# Patient Record
Sex: FEMALE | Race: WHITE
[De-identification: names, ages, dates, MRNs, and addresses within clinical notes are randomized per-mention and may not be internally consistent; named-entity substitution may affect disease eponyms.]

---

## 2017-05-10 ENCOUNTER — HOSPITAL ENCOUNTER (OUTPATIENT)
Dept: HOSPITAL 62 - RAD | Age: 22
End: 2017-05-10
Attending: NURSE PRACTITIONER
Payer: MEDICAID

## 2017-05-10 DIAGNOSIS — Z34.02: Primary | ICD-10-CM

## 2017-05-10 PROCEDURE — 76805 OB US >/= 14 WKS SNGL FETUS: CPT

## 2017-07-27 ENCOUNTER — HOSPITAL ENCOUNTER (OUTPATIENT)
Dept: HOSPITAL 62 - LC | Age: 22
Discharge: HOME | End: 2017-07-27
Attending: OBSTETRICS & GYNECOLOGY
Payer: MEDICAID

## 2017-07-27 DIAGNOSIS — O14.93: Primary | ICD-10-CM

## 2017-07-27 DIAGNOSIS — Z3A.37: ICD-10-CM

## 2017-07-27 LAB
ALBUMIN SERPL-MCNC: 2.9 G/DL (ref 3.5–5)
ALP SERPL-CCNC: 155 U/L (ref 38–126)
ALT SERPL-CCNC: 24 U/L (ref 9–52)
ANION GAP SERPL CALC-SCNC: 8 MMOL/L (ref 5–19)
APPEARANCE UR: (no result)
APPEARANCE UR: CLEAR
AST SERPL-CCNC: 17 U/L (ref 14–36)
BARBITURATES UR QL SCN: NEGATIVE
BASOPHILS # BLD AUTO: 0 10^3/UL (ref 0–0.2)
BASOPHILS NFR BLD AUTO: 0.3 % (ref 0–2)
BILIRUB DIRECT SERPL-MCNC: 0.2 MG/DL (ref 0–0.4)
BILIRUB SERPL-MCNC: 0.3 MG/DL (ref 0.2–1.3)
BILIRUB UR QL STRIP: NEGATIVE
BILIRUB UR QL STRIP: NEGATIVE
BUN SERPL-MCNC: 10 MG/DL (ref 7–20)
CALCIUM: 8.8 MG/DL (ref 8.4–10.2)
CHLORIDE SERPL-SCNC: 108 MMOL/L (ref 98–107)
CO2 SERPL-SCNC: 19 MMOL/L (ref 22–30)
CREAT SERPL-MCNC: 0.75 MG/DL (ref 0.52–1.25)
CREAT UR-MCNC: 27.7 MG/DL (ref 16–327)
CREAT UR-MCNC: 309.4 MG/DL (ref 16–327)
EOSINOPHIL # BLD AUTO: 0 10^3/UL (ref 0–0.6)
EOSINOPHIL NFR BLD AUTO: 0.2 % (ref 0–6)
ERYTHROCYTE [DISTWIDTH] IN BLOOD BY AUTOMATED COUNT: 15.2 % (ref 11.5–14)
GLUCOSE SERPL-MCNC: 96 MG/DL (ref 75–110)
GLUCOSE UR STRIP-MCNC: NEGATIVE MG/DL
GLUCOSE UR STRIP-MCNC: NEGATIVE MG/DL
HCT VFR BLD CALC: 35.7 % (ref 36–47)
HGB BLD-MCNC: 12 G/DL (ref 12–15.5)
HGB HCT DIFFERENCE: 0.3
KETONES UR STRIP-MCNC: NEGATIVE MG/DL
KETONES UR STRIP-MCNC: NEGATIVE MG/DL
LDH1 SERPL-CCNC: 398 U/L (ref 313–618)
LYMPHOCYTES # BLD AUTO: 1.8 10^3/UL (ref 0.5–4.7)
LYMPHOCYTES NFR BLD AUTO: 16.5 % (ref 13–45)
MCH RBC QN AUTO: 27.7 PG (ref 27–33.4)
MCHC RBC AUTO-ENTMCNC: 33.5 G/DL (ref 32–36)
MCV RBC AUTO: 83 FL (ref 80–97)
METHADONE UR QL SCN: NEGATIVE
MONOCYTES # BLD AUTO: 0.7 10^3/UL (ref 0.1–1.4)
MONOCYTES NFR BLD AUTO: 6.6 % (ref 3–13)
NEUTROPHILS # BLD AUTO: 8.1 10^3/UL (ref 1.7–8.2)
NEUTS SEG NFR BLD AUTO: 76.4 % (ref 42–78)
NITRITE UR QL STRIP: NEGATIVE
NITRITE UR QL STRIP: NEGATIVE
PCP UR QL SCN: NEGATIVE
PH UR STRIP: 5 [PH] (ref 5–9)
PH UR STRIP: 6 [PH] (ref 5–9)
POTASSIUM SERPL-SCNC: 4.4 MMOL/L (ref 3.6–5)
PROT SERPL-MCNC: 6 G/DL (ref 6.3–8.2)
PROT UR STRIP-MCNC: 100 MG/DL
PROT UR STRIP-MCNC: 1031.9 MG/DL (ref ?–12)
PROT UR STRIP-MCNC: 93.2 MG/DL (ref ?–12)
PROT UR STRIP-MCNC: >=500 MG/DL
RBC # BLD AUTO: 4.32 10^6/UL (ref 3.72–5.28)
SODIUM SERPL-SCNC: 134.8 MMOL/L (ref 137–145)
SP GR UR STRIP: 1
SP GR UR STRIP: 1.03
URATE SERPL-MCNC: 6.2 MG/DL (ref 2.5–6.2)
URINE OPIATES LOW: NEGATIVE
UROBILINOGEN UR-MCNC: NEGATIVE MG/DL (ref ?–2)
UROBILINOGEN UR-MCNC: NEGATIVE MG/DL (ref ?–2)
WBC # BLD AUTO: 10.6 10^3/UL (ref 4–10.5)

## 2017-07-27 PROCEDURE — 36415 COLL VENOUS BLD VENIPUNCTURE: CPT

## 2017-07-27 PROCEDURE — 84156 ASSAY OF PROTEIN URINE: CPT

## 2017-07-27 PROCEDURE — 82570 ASSAY OF URINE CREATININE: CPT

## 2017-07-27 PROCEDURE — 85025 COMPLETE CBC W/AUTO DIFF WBC: CPT

## 2017-07-27 PROCEDURE — 80053 COMPREHEN METABOLIC PANEL: CPT

## 2017-07-27 PROCEDURE — 83615 LACTATE (LD) (LDH) ENZYME: CPT

## 2017-07-27 PROCEDURE — 84550 ASSAY OF BLOOD/URIC ACID: CPT

## 2017-07-27 PROCEDURE — 81001 URINALYSIS AUTO W/SCOPE: CPT

## 2017-07-27 PROCEDURE — 59025 FETAL NON-STRESS TEST: CPT

## 2017-07-27 PROCEDURE — 80307 DRUG TEST PRSMV CHEM ANLYZR: CPT

## 2017-07-27 PROCEDURE — 4A1HXCZ MONITORING OF PRODUCTS OF CONCEPTION, CARDIAC RATE, EXTERNAL APPROACH: ICD-10-PCS | Performed by: OBSTETRICS & GYNECOLOGY

## 2017-07-27 NOTE — NON STRESS TEST REPORT
=================================================================

Non Stress Test

=================================================================

Datetime Report Generated by CPN: 07/27/2017 21:01

   

   

=================================================================

INDICATION

=================================================================

   

Indication for Study:  Ordered by Provider

Indication for Study (NST) Other:  Pre-E W/U

   

=================================================================

MONITORING

=================================================================

   

Monitor Explained:  Monitor Explained; Test Explained; Patient

   Verbalized Understanding

Time on Monitor:  07/27/2017 15:32

Time off Monitor:  07/27/2017 16:13

NST Duration:  41

   

=================================================================

NST INTERVENTIONS

=================================================================

   

NST Interventions:  PO Hydration; IV Fluids; Reposition Patient

Physician Notified NST:  Dr. Will

BABY A:  [id number]

   

=================================================================

BABY A

=================================================================

   

Fetal Movement :  Present

Contraction Frequency :  Irritability

FHR Baseline :  135

Accelerations :  15X15

Decelerations :  None

Variability :  Moderate 6-25bpm

NST Review:  Meets Criteria for Reactive NST

NST Review and Verified By :  best abel RN

NST Results:  Reactive

   

=================================================================

NST REPORT

=================================================================

   

Report Trigger:  Send Report

## 2017-07-31 ENCOUNTER — HOSPITAL ENCOUNTER (INPATIENT)
Dept: HOSPITAL 62 - LR | Age: 22
LOS: 3 days | Discharge: HOME | End: 2017-08-03
Attending: OBSTETRICS & GYNECOLOGY | Admitting: OBSTETRICS & GYNECOLOGY
Payer: MEDICAID

## 2017-07-31 DIAGNOSIS — E66.9: ICD-10-CM

## 2017-07-31 DIAGNOSIS — Z3A.37: ICD-10-CM

## 2017-07-31 LAB
ALBUMIN SERPL-MCNC: 3 G/DL (ref 3.5–5)
ALP SERPL-CCNC: 169 U/L (ref 38–126)
ALT SERPL-CCNC: 27 U/L (ref 9–52)
ANION GAP SERPL CALC-SCNC: 9 MMOL/L (ref 5–19)
APPEARANCE UR: (no result)
APTT BLD: 28.1 SEC (ref 23.5–35.8)
AST SERPL-CCNC: 21 U/L (ref 14–36)
BARBITURATES UR QL SCN: NEGATIVE
BASOPHILS # BLD AUTO: 0 10^3/UL (ref 0–0.2)
BASOPHILS NFR BLD AUTO: 0.3 % (ref 0–2)
BILIRUB DIRECT SERPL-MCNC: 0.2 MG/DL (ref 0–0.4)
BILIRUB SERPL-MCNC: 0.3 MG/DL (ref 0.2–1.3)
BILIRUB UR QL STRIP: NEGATIVE
BUN SERPL-MCNC: 10 MG/DL (ref 7–20)
CALCIUM: 9.2 MG/DL (ref 8.4–10.2)
CHLORIDE SERPL-SCNC: 107 MMOL/L (ref 98–107)
CO2 SERPL-SCNC: 19 MMOL/L (ref 22–30)
CREAT SERPL-MCNC: 0.78 MG/DL (ref 0.52–1.25)
CREAT UR-MCNC: 211.5 MG/DL (ref 16–327)
EOSINOPHIL # BLD AUTO: 0 10^3/UL (ref 0–0.6)
EOSINOPHIL NFR BLD AUTO: 0.1 % (ref 0–6)
ERYTHROCYTE [DISTWIDTH] IN BLOOD BY AUTOMATED COUNT: 15.5 % (ref 11.5–14)
GLUCOSE SERPL-MCNC: 62 MG/DL (ref 75–110)
GLUCOSE UR STRIP-MCNC: NEGATIVE MG/DL
HCT VFR BLD CALC: 34.9 % (ref 36–47)
HGB BLD-MCNC: 11.9 G/DL (ref 12–15.5)
HGB HCT DIFFERENCE: 0.8
KETONES UR STRIP-MCNC: NEGATIVE MG/DL
LDH1 SERPL-CCNC: 468 U/L (ref 313–618)
LYMPHOCYTES # BLD AUTO: 3 10^3/UL (ref 0.5–4.7)
LYMPHOCYTES NFR BLD AUTO: 26.2 % (ref 13–45)
MCH RBC QN AUTO: 28.4 PG (ref 27–33.4)
MCHC RBC AUTO-ENTMCNC: 34.2 G/DL (ref 32–36)
MCV RBC AUTO: 83 FL (ref 80–97)
METHADONE UR QL SCN: NEGATIVE
MONOCYTES # BLD AUTO: 0.8 10^3/UL (ref 0.1–1.4)
MONOCYTES NFR BLD AUTO: 7 % (ref 3–13)
NEUTROPHILS # BLD AUTO: 7.5 10^3/UL (ref 1.7–8.2)
NEUTS SEG NFR BLD AUTO: 66.4 % (ref 42–78)
NITRITE UR QL STRIP: NEGATIVE
PCP UR QL SCN: NEGATIVE
PH UR STRIP: 5 [PH] (ref 5–9)
POTASSIUM SERPL-SCNC: 4.4 MMOL/L (ref 3.6–5)
PROT SERPL-MCNC: 6.3 G/DL (ref 6.3–8.2)
PROT UR STRIP-MCNC: 871.8 MG/DL (ref ?–12)
PROT UR STRIP-MCNC: >=500 MG/DL
PROTHROMBIN TIME: 11.3 SEC (ref 11.4–15.4)
RBC # BLD AUTO: 4.2 10^6/UL (ref 3.72–5.28)
SODIUM SERPL-SCNC: 135.3 MMOL/L (ref 137–145)
SP GR UR STRIP: 1.02
URATE SERPL-MCNC: 6.6 MG/DL (ref 2.5–6.2)
URINE OPIATES LOW: NEGATIVE
UROBILINOGEN UR-MCNC: NEGATIVE MG/DL (ref ?–2)
WBC # BLD AUTO: 11.3 10^3/UL (ref 4–10.5)

## 2017-07-31 PROCEDURE — 36415 COLL VENOUS BLD VENIPUNCTURE: CPT

## 2017-07-31 PROCEDURE — 86900 BLOOD TYPING SEROLOGIC ABO: CPT

## 2017-07-31 PROCEDURE — 86901 BLOOD TYPING SEROLOGIC RH(D): CPT

## 2017-07-31 PROCEDURE — 3E0P7GC INTRODUCTION OF OTHER THERAPEUTIC SUBSTANCE INTO FEMALE REPRODUCTIVE, VIA NATURAL OR ARTIFICIAL OPENING: ICD-10-PCS | Performed by: OBSTETRICS & GYNECOLOGY

## 2017-07-31 PROCEDURE — 85610 PROTHROMBIN TIME: CPT

## 2017-07-31 PROCEDURE — 80053 COMPREHEN METABOLIC PANEL: CPT

## 2017-07-31 PROCEDURE — 85025 COMPLETE CBC W/AUTO DIFF WBC: CPT

## 2017-07-31 PROCEDURE — 4A1HXCZ MONITORING OF PRODUCTS OF CONCEPTION, CARDIAC RATE, EXTERNAL APPROACH: ICD-10-PCS | Performed by: OBSTETRICS & GYNECOLOGY

## 2017-07-31 PROCEDURE — 85027 COMPLETE CBC AUTOMATED: CPT

## 2017-07-31 PROCEDURE — 84112 EVAL AMNIOTIC FLUID PROTEIN: CPT

## 2017-07-31 PROCEDURE — 84550 ASSAY OF BLOOD/URIC ACID: CPT

## 2017-07-31 PROCEDURE — 81005 URINALYSIS: CPT

## 2017-07-31 PROCEDURE — 86850 RBC ANTIBODY SCREEN: CPT

## 2017-07-31 PROCEDURE — 85730 THROMBOPLASTIN TIME PARTIAL: CPT

## 2017-07-31 PROCEDURE — 82962 GLUCOSE BLOOD TEST: CPT

## 2017-07-31 PROCEDURE — 82570 ASSAY OF URINE CREATININE: CPT

## 2017-07-31 PROCEDURE — 84156 ASSAY OF PROTEIN URINE: CPT

## 2017-07-31 PROCEDURE — 80307 DRUG TEST PRSMV CHEM ANLYZR: CPT

## 2017-07-31 PROCEDURE — 76815 OB US LIMITED FETUS(S): CPT

## 2017-07-31 PROCEDURE — 86592 SYPHILIS TEST NON-TREP QUAL: CPT

## 2017-07-31 PROCEDURE — 83615 LACTATE (LD) (LDH) ENZYME: CPT

## 2017-07-31 NOTE — RADIOLOGY REPORT (SQ)
EXAM DESCRIPTION:  U/S OB LIMITED



COMPLETED DATE/TIME:  7/31/2017 10:04 pm



REASON FOR STUDY:  EFW, presentation, STEFANIE



COMPARISON:  None.



TECHNIQUE:  Limited transabdominal grayscale ultrasound for evaluation of specific requested obstetri
dalila parameters.



LIMITATIONS:  None.



FINDINGS:  CERVICAL LENGTH: Not visualized

STEFANIE: 10.8 cm.

FHR: 144 beats per minute.

PRESENTATION: Cephalic.

OTHER: Estimated fetal weight is 2666 +/- 395 g. estimated gestational age is 35 weeks 1 day with an 
estimated due date of 9/3/2017



IMPRESSION:  LIMITED OBSTETRICAL ULTRASOUND WITH MEASURED PARAMETERS DELINEATED ABOVE.

Trimester of pregnancy: Third trimester - 28 weeks to delivery.



TECHNICAL DOCUMENTATION:  JOB ID:  [id number]

 2011 [employer]- All Rights Reserved

## 2017-08-01 LAB
A1 MICROGLOB PLACENTAL VAG QL: POSITIVE
ALBUMIN SERPL-MCNC: 2.6 G/DL (ref 3.5–5)
ALBUMIN SERPL-MCNC: 2.7 G/DL (ref 3.5–5)
ALBUMIN SERPL-MCNC: 2.7 G/DL (ref 3.5–5)
ALP SERPL-CCNC: 148 U/L (ref 38–126)
ALP SERPL-CCNC: 158 U/L (ref 38–126)
ALP SERPL-CCNC: 172 U/L (ref 38–126)
ALT SERPL-CCNC: 20 U/L (ref 9–52)
ALT SERPL-CCNC: 25 U/L (ref 9–52)
ALT SERPL-CCNC: 26 U/L (ref 9–52)
ANION GAP SERPL CALC-SCNC: 9 MMOL/L (ref 5–19)
AST SERPL-CCNC: 19 U/L (ref 14–36)
AST SERPL-CCNC: 19 U/L (ref 14–36)
AST SERPL-CCNC: 20 U/L (ref 14–36)
BASOPHILS # BLD AUTO: 0 10^3/UL (ref 0–0.2)
BASOPHILS NFR BLD AUTO: 0.2 % (ref 0–2)
BASOPHILS NFR BLD AUTO: 0.3 % (ref 0–2)
BASOPHILS NFR BLD AUTO: 0.3 % (ref 0–2)
BILIRUB DIRECT SERPL-MCNC: 0.2 MG/DL (ref 0–0.4)
BILIRUB DIRECT SERPL-MCNC: 0.2 MG/DL (ref 0–0.4)
BILIRUB DIRECT SERPL-MCNC: 0.3 MG/DL (ref 0–0.4)
BILIRUB SERPL-MCNC: 0.3 MG/DL (ref 0.2–1.3)
BILIRUB SERPL-MCNC: 0.4 MG/DL (ref 0.2–1.3)
BILIRUB SERPL-MCNC: 0.5 MG/DL (ref 0.2–1.3)
BUN SERPL-MCNC: 10 MG/DL (ref 7–20)
BUN SERPL-MCNC: 9 MG/DL (ref 7–20)
BUN SERPL-MCNC: 9 MG/DL (ref 7–20)
CALCIUM: 8.8 MG/DL (ref 8.4–10.2)
CALCIUM: 8.8 MG/DL (ref 8.4–10.2)
CALCIUM: 8.9 MG/DL (ref 8.4–10.2)
CHLORIDE SERPL-SCNC: 109 MMOL/L (ref 98–107)
CHLORIDE SERPL-SCNC: 109 MMOL/L (ref 98–107)
CHLORIDE SERPL-SCNC: 110 MMOL/L (ref 98–107)
CO2 SERPL-SCNC: 16 MMOL/L (ref 22–30)
CO2 SERPL-SCNC: 18 MMOL/L (ref 22–30)
CO2 SERPL-SCNC: 19 MMOL/L (ref 22–30)
CREAT SERPL-MCNC: 0.73 MG/DL (ref 0.52–1.25)
CREAT SERPL-MCNC: 0.73 MG/DL (ref 0.52–1.25)
CREAT SERPL-MCNC: 0.76 MG/DL (ref 0.52–1.25)
EOSINOPHIL # BLD AUTO: 0 10^3/UL (ref 0–0.6)
EOSINOPHIL NFR BLD AUTO: 0 % (ref 0–6)
EOSINOPHIL NFR BLD AUTO: 0 % (ref 0–6)
EOSINOPHIL NFR BLD AUTO: 0.1 % (ref 0–6)
ERYTHROCYTE [DISTWIDTH] IN BLOOD BY AUTOMATED COUNT: 15.3 % (ref 11.5–14)
ERYTHROCYTE [DISTWIDTH] IN BLOOD BY AUTOMATED COUNT: 15.7 % (ref 11.5–14)
ERYTHROCYTE [DISTWIDTH] IN BLOOD BY AUTOMATED COUNT: 15.7 % (ref 11.5–14)
GLUCOSE SERPL-MCNC: 66 MG/DL (ref 75–110)
GLUCOSE SERPL-MCNC: 79 MG/DL (ref 75–110)
GLUCOSE SERPL-MCNC: 85 MG/DL (ref 75–110)
HCT VFR BLD CALC: 34.3 % (ref 36–47)
HCT VFR BLD CALC: 34.7 % (ref 36–47)
HCT VFR BLD CALC: 37.7 % (ref 36–47)
HGB BLD-MCNC: 11.5 G/DL (ref 12–15.5)
HGB BLD-MCNC: 11.6 G/DL (ref 12–15.5)
HGB BLD-MCNC: 12.5 G/DL (ref 12–15.5)
HGB HCT DIFFERENCE: -0.2
HGB HCT DIFFERENCE: 0.1
HGB HCT DIFFERENCE: 0.2
LDH1 SERPL-CCNC: 383 U/L (ref 313–618)
LDH1 SERPL-CCNC: 390 U/L (ref 313–618)
LDH1 SERPL-CCNC: 446 U/L (ref 313–618)
LYMPHOCYTES # BLD AUTO: 1.9 10^3/UL (ref 0.5–4.7)
LYMPHOCYTES # BLD AUTO: 2.5 10^3/UL (ref 0.5–4.7)
LYMPHOCYTES # BLD AUTO: 2.7 10^3/UL (ref 0.5–4.7)
LYMPHOCYTES NFR BLD AUTO: 14 % (ref 13–45)
LYMPHOCYTES NFR BLD AUTO: 15.5 % (ref 13–45)
LYMPHOCYTES NFR BLD AUTO: 25.3 % (ref 13–45)
MCH RBC QN AUTO: 27.6 PG (ref 27–33.4)
MCH RBC QN AUTO: 27.7 PG (ref 27–33.4)
MCH RBC QN AUTO: 27.7 PG (ref 27–33.4)
MCHC RBC AUTO-ENTMCNC: 33.2 G/DL (ref 32–36)
MCHC RBC AUTO-ENTMCNC: 33.5 G/DL (ref 32–36)
MCHC RBC AUTO-ENTMCNC: 33.7 G/DL (ref 32–36)
MCV RBC AUTO: 82 FL (ref 80–97)
MCV RBC AUTO: 83 FL (ref 80–97)
MCV RBC AUTO: 83 FL (ref 80–97)
MONOCYTES # BLD AUTO: 0.6 10^3/UL (ref 0.1–1.4)
MONOCYTES # BLD AUTO: 0.7 10^3/UL (ref 0.1–1.4)
MONOCYTES # BLD AUTO: 1.2 10^3/UL (ref 0.1–1.4)
MONOCYTES NFR BLD AUTO: 4.7 % (ref 3–13)
MONOCYTES NFR BLD AUTO: 6.5 % (ref 3–13)
MONOCYTES NFR BLD AUTO: 6.9 % (ref 3–13)
NEUTROPHILS # BLD AUTO: 14.1 10^3/UL (ref 1.7–8.2)
NEUTROPHILS # BLD AUTO: 7.1 10^3/UL (ref 1.7–8.2)
NEUTROPHILS # BLD AUTO: 9.8 10^3/UL (ref 1.7–8.2)
NEUTS SEG NFR BLD AUTO: 67.4 % (ref 42–78)
NEUTS SEG NFR BLD AUTO: 79.2 % (ref 42–78)
NEUTS SEG NFR BLD AUTO: 79.6 % (ref 42–78)
POTASSIUM SERPL-SCNC: 3.8 MMOL/L (ref 3.6–5)
POTASSIUM SERPL-SCNC: 4.4 MMOL/L (ref 3.6–5)
POTASSIUM SERPL-SCNC: 4.4 MMOL/L (ref 3.6–5)
PROT SERPL-MCNC: 5.6 G/DL (ref 6.3–8.2)
PROT SERPL-MCNC: 5.8 G/DL (ref 6.3–8.2)
PROT SERPL-MCNC: 5.8 G/DL (ref 6.3–8.2)
RBC # BLD AUTO: 4.17 10^6/UL (ref 3.72–5.28)
RBC # BLD AUTO: 4.19 10^6/UL (ref 3.72–5.28)
RBC # BLD AUTO: 4.53 10^6/UL (ref 3.72–5.28)
SODIUM SERPL-SCNC: 135.3 MMOL/L (ref 137–145)
SODIUM SERPL-SCNC: 135.9 MMOL/L (ref 137–145)
SODIUM SERPL-SCNC: 136.8 MMOL/L (ref 137–145)
URATE SERPL-MCNC: 6.5 MG/DL (ref 2.5–6.2)
URATE SERPL-MCNC: 6.7 MG/DL (ref 2.5–6.2)
URATE SERPL-MCNC: 6.7 MG/DL (ref 2.5–6.2)
WBC # BLD AUTO: 10.6 10^3/UL (ref 4–10.5)
WBC # BLD AUTO: 12.3 10^3/UL (ref 4–10.5)
WBC # BLD AUTO: 17.8 10^3/UL (ref 4–10.5)

## 2017-08-01 PROCEDURE — 0KQM0ZZ REPAIR PERINEUM MUSCLE, OPEN APPROACH: ICD-10-PCS | Performed by: OBSTETRICS & GYNECOLOGY

## 2017-08-01 NOTE — ADMISSION PHYSICAL
=================================================================



=================================================================

Datetime Report Generated by CPN: 2017 10:37

   

   

=================================================================

CURRENT ADMISSION

=================================================================

   

Chief Complaint:  Scheduled Induction of Labor

Indication for Induction:  PreEclampsia

Indication for Induction- Other:  Atypical PreE with Proteinuria >3

   grams

Admit Plan:  Admit to Unit; Initiate Labor Induction Protocol

   

=================================================================

ALLERGIES

=================================================================

   

Medication Allergies:  No

Medication Allergies:  No Known Allergies (2017)

Latex:  No Latex Allergies

Food Allergies:  none

Environmental Allergies:  none

   

=================================================================

OBSTETRICAL HISTORY

=================================================================

   

EDC:  2017 00:00

:  1

Para:  0

Term:  0

:  0

SAB:  0

IAB:  0

Ectopic:  0

Livin

Cesareans:  0

VBACs:  0

Multiple Births:  0

Gestational Diabetes:  No

Rh Sensitization:  No

Incompetent Cervix:  No

YASMIN:  No

Infertility:  No

ART Treatment:  No

Uterine Anomaly:  No

IUGR:  No

Hx Previous C/S:  No

Macrosomia:  No

Hx Loss/Stillborn:  No

Hx  Death:  No

Placenta Previa/Abruption:  No

Depression/PP Depression:  No

PTL/PROM:  No

Post Partum Hemorrhage:  No

Current Pregnancy Procedures:  Ultrasound

   

=================================================================

***SEE PRENATAL RECORDS***

=================================================================

   

Alcohol:  No

Marijuana :  No

Cocaine:  No

Other Illicit Drugs:  No

Cigarettes:  Never Smoker. [id number]

   

=================================================================

MEDICAL HISTORY

=================================================================

   

Diabetes:  No

Blood Transfusion:  No

Pulmonary Disease (Asthma, TB):  No

Breast Disease:  No

Hypertension:  No

Gyn Surgery:  No

Heart Disease:  No

Hosp/Surgery:  No

Autoimmune Disorder:  No

Anesthetic Complications:  No

Kidney Disease:  No

Abnormal Pap Smear:  No

Neuro/Epilepsy:  No

Psychiatric Disorders:  No

Other Medical Diseases:  Yes

Hepatitis/Liver Disease:  No

Significant Family History:  No

Varicosities/Phlebitis:  No

Trauma/Violence :  No

Thyroid Dysfunction:  No

Medical History Comments:  OBESITY

   

=================================================================

INFECTIOUS HISTORY

=================================================================

   

Gonorrhea:  No

Genital Herpes:  No

Chlamydia:  Yes

Tuberculosis:  No

Syphilis:  No

Hepatitis:  No

HIV/AIDS Exposure:  No

Rash or Viral Illness:  No

HPV:  No

Infectious History Comments:  CHLAMYDIA- 17, ANTONIA 17- NEGATIVE

   

=================================================================

PHYSICAL EXAM

=================================================================

   

General:  Normal

HEENT:  Normal

Neurologic:  Normal

Thyroid:  Normal

Heart:  Normal

Lungs:  Normal

Breast:  Deferred

Back:  Normal

Abdomen:  Normal

Genitourinary Exam:  Normal

Extremities:  Abnormal

DTRs:  Normal

Pelvic Type:  Adequate

Vital Signs:  Reviewed

   

=================================================================

VAGINAL EXAM

=================================================================

   

Dilatation:  1

Effacement:  25

Station:  -3

Contraction Comments:  irregular

   

=================================================================

MEMBRANES

=================================================================

   

Membranes:  Intact

   

=================================================================

FETUS A

=================================================================

   

Monitoring:  External US

FHR- Baseline:  125

Variability:  Moderate 6-25bpm

Accelerations:  15X15

Decelerations:  None

FHR Category:  Category I

   

=================================================================

PLANS FOR LABOR AND DELIVERY

=================================================================

   

Labor and Delivery:  None

Pain Management:  None

Feeding Preference:  Breast

Benefit of Breast Feed Discussed:  Yes

Circumcision:  N/A

## 2017-08-01 NOTE — L&D PROGRESS NOTES
=================================================================

PROGRESS NOTES

=================================================================

Datetime Report Generated by CPN: 08/01/2017 20:55

   

   

=================================================================

PROGRESS NOTE

=================================================================

   

Impression:  Rupture of Membranes

Impression Other:  preeclampsia

Plan:  Continue Present Management

Vital Signs :  Reviewed

Comment:  Perhaps membranes ruptured.  Check amnisure.  Pitocin was cut

   in half because the contractions have increased.

   

=================================================================

VAGINAL EXAM

=================================================================

   

Dilatation:  2

   

=================================================================

FETUS A

=================================================================

   

FHR - Baseline:  140

Variability:  Moderate 6-25bpm

Decelerations:  None

FHR Category:  Category I

:  37.0

   

=================================================================

FETUS C

=================================================================

   

SIGNATURE:  [id number]

Signature:  Electronically signed by Lana Lane MD ([facility]) on

   8/1/2017 at 20:55  with User ID: [email or web address]

## 2017-08-01 NOTE — L&D PROGRESS NOTES
=================================================================

PROGRESS NOTES

=================================================================

Datetime Report Generated by CPN: 2017 06:43

   

   

=================================================================

PROGRESS NOTE

=================================================================

   

Impression:  Normal Progression of Labor

Procedures:  Sterile Vag Exam

Plan:  Continue Present Management; Induction; Cervical Ripening

Informed Consent Obtained:  Vaginal Delivery; Risks, Benefits and

   Alternatives Discussed

Informed Consent Obtained:  Vaginal Delivery; Induction of Labor;

   Risks, Benefits and Alternatives Discussed

Vital Signs :  Reviewed

Vital Signs Comments:  Mild range BPs

Comment:  20yo  at 37+4 with PreE (mild range BPs and 24 hr UTP

   3330mg) here for IOL.  Cervidil placed at 2301.  Crawford to gravity

   placed at 2222 - and UOP 50ml/hr since crawford to gravity placed. 

   Repeat Labs this am now.  May need to start magnesium as DBPs are

   increasing.  no severe range BPs noted. Reassuring FHR tracing.  Pt

   denies HA. still with 2+ pedal edema.  P:C ratio last night was 4.1

   and creatinine was 7.8.  Will recheck cervix and attempt FB/cooks

   catheter once nursing change of shift.

   

=================================================================

VAGINAL EXAM

=================================================================

   

Dilatation:  1

Dilatation:  1

Effacement:  25

Effacement:  25

Station:  -3

Station:  -3

Contractions:  irregular

   

=================================================================

MEMBRANES

=================================================================

   

Membranes:  Intact

Membranes:  Intact

Amniotic Fluid Color:  Clear

   

=================================================================

FETUS A

=================================================================

   

FHR - Baseline:  135

Monitoring:  External US

Variability:  Moderate 6-25bpm

Accelerations:  15X15

Decelerations:  None

   

=================================================================

SIGNATURE

=================================================================

   

SIGNATURE:  [id number];[id number]

SIGNATURE:  [id number]

Signature:  Electronically signed by Apryl Gonzales MD ([facility]) on

   2017 at 06:43  with User ID: [email or web address]

## 2017-08-01 NOTE — L&D PROGRESS NOTES
=================================================================

PROGRESS NOTES

=================================================================

Datetime Report Generated by CPN: 2017 07:44

   

   

=================================================================

PROGRESS NOTE

=================================================================

   

Impression:  Normal Progression of Labor

Procedures:  Sterile Vag Exam

Plan:  Induction; Anticipate Vaginal Delivery

Informed Consent Obtained:  Vaginal Delivery; Risks, Benefits and

   Alternatives Discussed

Vital Signs :  Reviewed

Vital Signs Comments:  Mild range BPs

Comment:  Cvx 1/50/-3/mid/medium consistency.  Cervidil due to be

   removed at 1100.  Will allow pt to eat.  UOP appro 50ml/hr.  Will

   continue with IOL.  Cr improved slightly from yesterday.  May need

   to begin magnesium if BPs trend up again or UOP trends down.  Irreg

   ctx on cervidil.  Pt aware that if renal function/UOP decreases or

   if Labs worsen and need for urgent delivery remote from delivery

   would require  section.  Will continue with IOL for now. 

   EFW 14% and cerphalic presentation with STEFANIE 10

   

=================================================================

VAGINAL EXAM

=================================================================

   

Dilatation:  1

Effacement:  50

Station:  -3

   

=================================================================

MEMBRANES

=================================================================

   

Membranes:  Intact

   

=================================================================

FETUS A

=================================================================

   

FHR - Baseline:  140

Monitoring:  External US

Variability:  Moderate 6-25bpm

Accelerations:  15X15

Decelerations:  None

FHR Category:  Category I

Estimated Fetal Weight (gm):  2666

   

=================================================================

FETUS C

=================================================================

   

SIGNATURE:  [id number];[id number]

Signature:  Electronically signed by Apryl Gonzales MD ([facility]) on

   2017 at 07:43  with User ID: [email or web address]

## 2017-08-02 LAB
ALBUMIN SERPL-MCNC: 2.5 G/DL (ref 3.5–5)
ALP SERPL-CCNC: 145 U/L (ref 38–126)
ALT SERPL-CCNC: 27 U/L (ref 9–52)
ANION GAP SERPL CALC-SCNC: 6 MMOL/L (ref 5–19)
AST SERPL-CCNC: 26 U/L (ref 14–36)
BILIRUB DIRECT SERPL-MCNC: 0.2 MG/DL (ref 0–0.4)
BILIRUB SERPL-MCNC: 0.3 MG/DL (ref 0.2–1.3)
BUN SERPL-MCNC: 10 MG/DL (ref 7–20)
CALCIUM: 8.8 MG/DL (ref 8.4–10.2)
CHLORIDE SERPL-SCNC: 111 MMOL/L (ref 98–107)
CO2 SERPL-SCNC: 18 MMOL/L (ref 22–30)
CREAT SERPL-MCNC: 0.85 MG/DL (ref 0.52–1.25)
GLUCOSE SERPL-MCNC: 79 MG/DL (ref 75–110)
LDH1 SERPL-CCNC: 540 U/L (ref 313–618)
POTASSIUM SERPL-SCNC: 4.6 MMOL/L (ref 3.6–5)
PROT SERPL-MCNC: 5.6 G/DL (ref 6.3–8.2)
SODIUM SERPL-SCNC: 135.4 MMOL/L (ref 137–145)
URATE SERPL-MCNC: 6.4 MG/DL (ref 2.5–6.2)

## 2017-08-02 RX ADMIN — FERROUS SULFATE TAB 325 MG (65 MG ELEMENTAL FE) SCH MG: 325 (65 FE) TAB at 09:32

## 2017-08-02 RX ADMIN — PRENATAL W/O A VIT W/ FE FUMARATE-FA CAP 106.5-1 MG SCH CAP: 106.5 CAPSULE ORAL at 09:32

## 2017-08-02 RX ADMIN — DOCUSATE SODIUM SCH MG: 100 CAPSULE, LIQUID FILLED ORAL at 09:32

## 2017-08-02 RX ADMIN — FAMOTIDINE SCH MG: 20 TABLET, FILM COATED ORAL at 21:17

## 2017-08-02 RX ADMIN — FERROUS SULFATE TAB 325 MG (65 MG ELEMENTAL FE) SCH MG: 325 (65 FE) TAB at 18:40

## 2017-08-02 RX ADMIN — SENNOSIDES, DOCUSATE SODIUM SCH EACH: 50; 8.6 TABLET, FILM COATED ORAL at 09:32

## 2017-08-02 RX ADMIN — DOCUSATE SODIUM SCH MG: 100 CAPSULE, LIQUID FILLED ORAL at 18:40

## 2017-08-02 RX ADMIN — FAMOTIDINE SCH MG: 20 TABLET, FILM COATED ORAL at 09:32

## 2017-08-02 NOTE — ADMISSION PHYSICAL
=================================================================



=================================================================

Datetime Report Generated by CPN: 2017 00:36

   

   

=================================================================

CURRENT ADMISSION

=================================================================

   

Chief Complaint:  Scheduled Induction of Labor

Indication for Induction:  PreEclampsia

Indication for Induction- Other:  Atypical PreE with Proteinuria >3

   grams

Admit Plan:  Admit to Unit; Initiate Labor Induction Protocol

   

=================================================================

ALLERGIES

=================================================================

   

Medication Allergies:  No

Medication Allergies:  No Known Allergies (2017)

Latex:  No Latex Allergies

Food Allergies:  none

Environmental Allergies:  none

   

=================================================================

OBSTETRICAL HISTORY

=================================================================

   

EDC:  2017 00:00

:  1

Para:  0

Term:  0

:  0

SAB:  0

IAB:  0

Ectopic:  0

Livin

Cesareans:  0

VBACs:  0

Multiple Births:  0

Gestational Diabetes:  No

Rh Sensitization:  No

Incompetent Cervix:  No

YASMIN:  No

Infertility:  No

ART Treatment:  No

Uterine Anomaly:  No

IUGR:  No

Hx Previous C/S:  No

Macrosomia:  No

Hx Loss/Stillborn:  No

PIH:  Yes

Hx  Death:  No

Placenta Previa/Abruption:  No

Depression/PP Depression:  No

PTL/PROM:  No

Post Partum Hemorrhage:  No

Current Pregnancy Procedures:  Ultrasound

Obstetrical History Comments:  G1 - Current

      

   

=================================================================

***SEE PRENATAL RECORDS***

=================================================================

   

Alcohol:  No

Marijuana :  No

Cocaine:  No

Other Illicit Drugs:  No

Cigarettes:  Never Smoker. [id number]

   

=================================================================

MEDICAL HISTORY

=================================================================

   

Diabetes:  No

Blood Transfusion:  No

Pulmonary Disease (Asthma, TB):  No

Breast Disease:  No

Hypertension:  No

Gyn Surgery:  No

Heart Disease:  No

Hosp/Surgery:  No

Autoimmune Disorder:  No

Anesthetic Complications:  No

Kidney Disease:  No

Abnormal Pap Smear:  No

Neuro/Epilepsy:  No

Psychiatric Disorders:  No

Other Medical Diseases:  Yes

Hepatitis/Liver Disease:  No

Significant Family History:  No

Varicosities/Phlebitis:  No

Trauma/Violence :  No

Thyroid Dysfunction:  No

Medical History Comments:  OBESITY

   

=================================================================

INFECTIOUS HISTORY

=================================================================

   

Gonorrhea:  No

Genital Herpes:  No

Chlamydia:  Yes

Tuberculosis:  No

Syphilis:  No

Hepatitis:  No

HIV/AIDS Exposure:  No

Rash or Viral Illness:  No

HPV:  No

Infectious History Comments:  CHLAMYDIA- 17, ANTONIA 17- NEGATIVE

   

=================================================================

PHYSICAL EXAM

=================================================================

   

General:  Normal

HEENT:  Normal

Neurologic:  Normal

Thyroid:  Normal

Heart:  Normal

Lungs:  Normal

Breast:  Deferred

Back:  Normal

Abdomen:  Normal

Genitourinary Exam:  Normal

Extremities:  Abnormal

DTRs:  Normal

Pelvic Type:  Adequate

Physical Exam Comments:  2+ DTRs, no clonus, 2+ pedal edema

Vital Signs:  Reviewed

   

=================================================================

VAGINAL EXAM

=================================================================

   

Dilatation:  2

Dilatation:  1

Effacement:  50

Effacement:  25

Effacement:  25

Station:  -3

Contraction Comments:  irregular

   

=================================================================

MEMBRANES

=================================================================

   

Membranes:  Intact

Amniotic Fluid Color:  Clear

   

=================================================================

FETUS A

=================================================================

   

EGA:  37.3

Monitoring:  External US

FHR- Baseline:  125

Variability:  Moderate 6-25bpm

Accelerations:  15X15

Decelerations:  None

FHR Category:  Category I

Estimated Fetal Weight (gm):  2666

Admit Comment:  22yo  at 37+3ega with PreE (mild range BPs and

   significant Proteinuria).  24 hr UTP 3330mg today.  Pt reports

   oliguria last week.  No US done in office and last US done 2017. 

   US for EFW/presentation/STEFANIE.  She denies HA/blurry vision/RUQ pain.

   no clonus. 2+ pedal edema.  Will perform PIH labs and strict I/Os

   with crawford to gravity.  Will be cautious with IVF and may need

   Magnesium sulfate.  Will defer magnesium sulfate until labs

   available for review.  COags ordered due to sig proteinuria.  Pt

   aware that if baby does not tolerate labor or her labs worsen or

   oliguria presents and she is remote from delivery that Primary C/S

   may be warranted.  H/o chlamydia during prengancy.

   

=================================================================

PLANS FOR LABOR AND DELIVERY

=================================================================

   

Labor and Delivery:  None

Pain Management:  None

Feeding Preference:  Breast

Benefit of Breast Feed Discussed:  Yes

Circumcision:  N/A

   

=================================================================

INFORMED CONSENT

=================================================================

   

Informed Consent Obtained:  Vaginal Delivery; Risks, Benefits and

   Alternatives Discussed

Informed Consent Obtained:  Vaginal Delivery; Risks, Benefits and

   Alternatives Discussed

Informed Consent Obtained:  Vaginal Delivery; Induction of Labor;

   Risks, Benefits and Alternatives Discussed

Signature:  Electronically signed by Apryl Gonzales MD ([facility]) on

   2017 at 21:28  with User ID: [email or web address]

## 2017-08-02 NOTE — PDOC PROGRESS REPORT
Subjective-OB


Subjective: 


Post Delivery Day:1








21 year old G1 now P1 s/p  ppd 1.  States lochia stable, voiding large 

amount of clear urine and reports swelling has decreased since yesterday. 

Denies any s/s of pre-e or any needs at this time .








Physical Exam (OB)


Vital Signs: 


 











Temp Pulse Resp BP Pulse Ox


 


 98.1 F   91   16   134/82 H  99 


 


 17 07:34  17 07:34  17 07:34  17 07:34  17 07:34








 Intake & Output











 17





 06:59 06:59 06:59


 


Output Total  400 200


 


Balance  -400 -200


 


Weight 86.55 kg  














- General


General Appearance: Appears well


In distress: None





- PIH/Pre-Eclampsia


DTR's: 1 +


Clonus: Negative


Headache: Absent


Epigastric Pain: No


Visual Changes: No





- Episiotomy/Laceration


Site Condition: Well Approximated, Edematous





- Lochia


Lochia Amount: Scant < 10 ml


Lochia Color: Rubra/Red





- Abdomen


Description: Soft


Hernia Present: No


Fundal Description: Firm, Midline


Fundal Height: u/u - u/2





- Respiratory


Respiratory Status: No respiratory distress





- Extremities


Upper extremity: Normal inspection


Lower extremities: Edema


Calf: Nontender


Ankle: Edema


Foot: Nontender





- Neurological


Cognition: Normal


Orientation: AAOx4





- Psychological


Associated symptoms: Normal affect, Normal mood, Flat affect





Objective-Diagnostic


Laboratory: 


 





 17 21:39 





 17 07:18 





 











  17





  13:38 13:38 21:39


 


WBC  12.3 H   17.8 H


 


RBC  4.19   4.53


 


Hgb  11.6 L   12.5


 


Hct  34.7 L   37.7


 


MCV  83   83


 


MCH  27.7   27.6


 


MCHC  33.5   33.2


 


RDW  15.7 H   15.7 H


 


Plt Count  209   221


 


Seg Neutrophils %  79.6 H   79.2 H


 


Lymphocytes %  15.5   14.0


 


Monocytes %  4.7   6.5


 


Eosinophils %  0.0   0.0


 


Basophils %  0.2   0.3


 


Absolute Neutrophils  9.8 H   14.1 H


 


Absolute Lymphocytes  1.9   2.5


 


Absolute Monocytes  0.6   1.2


 


Absolute Eosinophils  0.0   0.0


 


Absolute Basophils  0.0   0.0


 


Sodium   135.9 L 


 


Potassium   4.4 


 


Chloride   109 H 


 


Carbon Dioxide   18 L 


 


Anion Gap   9 


 


BUN   9 


 


Creatinine   0.76 


 


Est GFR ( Amer)   > 60 


 


Est GFR (Non-Af Amer)   > 60 


 


Glucose   85 


 


Uric Acid   6.7 H 


 


Calcium   8.9 


 


Total Bilirubin   0.4 


 


AST   19 


 


ALT   25 


 


Alkaline Phosphatase   158 H 


 


Total Protein   5.8 L 


 


Albumin   2.7 L 














  17





  21:39 07:18


 


WBC  


 


RBC  


 


Hgb  


 


Hct  


 


MCV  


 


MCH  


 


MCHC  


 


RDW  


 


Plt Count  


 


Seg Neutrophils %  


 


Lymphocytes %  


 


Monocytes %  


 


Eosinophils %  


 


Basophils %  


 


Absolute Neutrophils  


 


Absolute Lymphocytes  


 


Absolute Monocytes  


 


Absolute Eosinophils  


 


Absolute Basophils  


 


Sodium  135.3 L  135.4 L


 


Potassium  3.8  4.6


 


Chloride  110 H  111 H


 


Carbon Dioxide  16 L  18 L


 


Anion Gap  9  6


 


BUN  9  10


 


Creatinine  0.73  0.85


 


Est GFR ( Amer)  > 60  > 60


 


Est GFR (Non-Af Amer)  > 60  > 60


 


Glucose  79  79


 


Uric Acid  6.5 H  6.4 H


 


Calcium  8.8  8.8


 


Total Bilirubin  0.5  0.3


 


AST  20  26


 


ALT  26  27


 


Alkaline Phosphatase  172 H  145 H


 


Total Protein  5.6 L  5.6 L


 


Albumin  2.6 L  2.5 L














Assessment and Plan(PN)





- Assessment and Plan


(1) Vaginal delivery


Is this a current diagnosis for this admission?: YesPlan: 


continue stay








(2) Pre-eclampsia


Qualifiers: 


     Trimester: unspecified trimester        Qualified Code(s): O14.90 - 

Unspecified pre-eclampsia, unspecified trimester  


Is this a current diagnosis for this admission?: YesPlan: 


PIH labs again the am, NSAIDS d/c. Discussed hx, vitals, labs with Dr. Malave 

who agrees with plan of care. Continue stay











- Time Spent with Patient


Time with patient: 15-25 minutes


Medications reviewed and adjusted accordingly: Yes





- Disposition


Anticipated Discharge: Home


Within: within 24 hours

## 2017-08-03 VITALS — SYSTOLIC BLOOD PRESSURE: 126 MMHG | DIASTOLIC BLOOD PRESSURE: 81 MMHG

## 2017-08-03 LAB
ALBUMIN SERPL-MCNC: 2.4 G/DL (ref 3.5–5)
ALP SERPL-CCNC: 118 U/L (ref 38–126)
ALT SERPL-CCNC: 29 U/L (ref 9–52)
ANION GAP SERPL CALC-SCNC: 7 MMOL/L (ref 5–19)
AST SERPL-CCNC: 22 U/L (ref 14–36)
BILIRUB DIRECT SERPL-MCNC: 0.2 MG/DL (ref 0–0.4)
BILIRUB SERPL-MCNC: 0.3 MG/DL (ref 0.2–1.3)
BUN SERPL-MCNC: 10 MG/DL (ref 7–20)
CALCIUM: 8.2 MG/DL (ref 8.4–10.2)
CHLORIDE SERPL-SCNC: 112 MMOL/L (ref 98–107)
CO2 SERPL-SCNC: 19 MMOL/L (ref 22–30)
CREAT SERPL-MCNC: 0.76 MG/DL (ref 0.52–1.25)
ERYTHROCYTE [DISTWIDTH] IN BLOOD BY AUTOMATED COUNT: 15.5 % (ref 11.5–14)
GLUCOSE SERPL-MCNC: 66 MG/DL (ref 75–110)
HCT VFR BLD CALC: 30.9 % (ref 36–47)
HGB BLD-MCNC: 10.3 G/DL (ref 12–15.5)
HGB HCT DIFFERENCE: 0
MCH RBC QN AUTO: 28.2 PG (ref 27–33.4)
MCHC RBC AUTO-ENTMCNC: 33.2 G/DL (ref 32–36)
MCV RBC AUTO: 85 FL (ref 80–97)
POTASSIUM SERPL-SCNC: 4.5 MMOL/L (ref 3.6–5)
PROT SERPL-MCNC: 5.3 G/DL (ref 6.3–8.2)
RBC # BLD AUTO: 3.64 10^6/UL (ref 3.72–5.28)
SODIUM SERPL-SCNC: 137.8 MMOL/L (ref 137–145)
WBC # BLD AUTO: 11.6 10^3/UL (ref 4–10.5)

## 2017-08-03 RX ADMIN — FERROUS SULFATE TAB 325 MG (65 MG ELEMENTAL FE) SCH MG: 325 (65 FE) TAB at 09:38

## 2017-08-03 RX ADMIN — DOCUSATE SODIUM SCH MG: 100 CAPSULE, LIQUID FILLED ORAL at 09:38

## 2017-08-03 RX ADMIN — FAMOTIDINE SCH MG: 20 TABLET, FILM COATED ORAL at 09:39

## 2017-08-03 RX ADMIN — SENNOSIDES, DOCUSATE SODIUM SCH EACH: 50; 8.6 TABLET, FILM COATED ORAL at 09:38

## 2017-08-03 RX ADMIN — PRENATAL W/O A VIT W/ FE FUMARATE-FA CAP 106.5-1 MG SCH CAP: 106.5 CAPSULE ORAL at 09:39

## 2017-08-03 NOTE — PDOC DISCHARGE SUMMARY
Final Diagnosis


Discharge Date: 08/03/17





- Final Diagnosis


(1) Vaginal delivery


Is this a current diagnosis for this admission?: Yes








Discharge Data





- Discharge Medication


Home Medications: 








Prenatal Vit/Iron Fumarate/FA [Prenatal Tablet] 1 each PO DAILY 07/27/17 








Reason(s) for Admission: Induction of Labor


Prenatal Procedures: None


Intrapartum Procedure(s): Spontaneous Vaginal Delivery


Postpartum Complication(s): Laceration-Vaginal


Laceration-Degree: 2nd





- Diagnosis Test


Laboratory: 


 











Temp Pulse Resp BP Pulse Ox


 


 98.4 F   100   17   148/92 H  99 


 


 08/03/17 08:56  08/03/17 08:56  08/03/17 08:56  08/03/17 08:56  08/03/17 08:56








 











  07/31/17 07/31/17 08/01/17





  21:00 21:11 06:35


 


RBC   4.20  4.17


 


Hgb   11.9 L  11.5 L


 


Hct   34.9 L  34.3 L


 


Urine Opiates Screen  NEGATIVE  














  08/01/17 08/01/17 08/03/17





  13:38 21:39 05:49


 


RBC  4.19  4.53  3.64 L


 


Hgb  11.6 L  12.5  10.3 L D


 


Hct  34.7 L  37.7  30.9 L


 


Urine Opiates Screen   














- Discharge information/Instructions


Discharge Activity: No Lifting Over 10 Pounds, Pelvic Rest, No tub bath


Discharge Diet: Regular


Disposition: HOME, SELF-CARE


Follow up with: [facility]


in: 4 - depo prior to discharge precautions reviewed

## 2019-03-26 ENCOUNTER — HOSPITAL ENCOUNTER (EMERGENCY)
Dept: HOSPITAL 62 - ER | Age: 24
LOS: 1 days | Discharge: HOME | End: 2019-03-27
Payer: SELF-PAY

## 2019-03-26 DIAGNOSIS — S93.402A: Primary | ICD-10-CM
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
## 2019-03-26 PROCEDURE — 96372 THER/PROPH/DIAG INJ SC/IM: CPT

## 2019-03-26 PROCEDURE — 73610 X-RAY EXAM OF ANKLE: CPT

## 2019-03-26 PROCEDURE — 99283 EMERGENCY DEPT VISIT LOW MDM: CPT

## 2019-03-26 NOTE — RADIOLOGY REPORT (SQ)
EXAM DESCRIPTION:

XR ANKLE 3 OR MORE VIEWS



COMPLETED DATE/TME:  03/26/2019 22:36



CLINICAL HISTORY:  Pain.  

23 years Female, trauma



COMPARISON:

None.



Findings:



Bones, joints, and soft tissues of the LEFT XR ANKLE 3 OR MORE

VIEWS appear intact. 



IMPRESSION:



No acute findings.

## 2019-03-26 NOTE — ER DOCUMENT REPORT
ED General





- General


Chief Complaint: Ankle Pain


Stated Complaint: ANKLE PAIN


Time Seen by Provider: 03/26/19 21:19


Notes: 





Patient is a 23-year-old female presents with complaint of concern for injury to

her left ankle.  She was walking and tripped over a tile and rolled her left 

ankle.  She denies previous history of ankle injuries.  No into her knee.  No 

numbness or weakness into the foot.  No other complaints at this time.


TRAVEL OUTSIDE OF THE [place] IN LAST 30 DAYS: No





- Related Data


Allergies/Adverse Reactions: 


                                        





No Known Allergies Allergy (Unverified 07/27/17 15:25)


   











Past Medical History





- Social History


Smoking Status: Never Smoker


Chew tobacco use (# tins/day): No


Frequency of alcohol use: None


Drug Abuse: None


Family History: Reviewed & Not Pertinent


Patient has suicidal ideation: No


Patient has homicidal ideation: No


Renal/ Medical History: Denies: Hx Peritoneal Dialysis





Review of Systems





- Review of Systems


Notes: 





My Normal Review Basic





REVIEW OF SYSTEMS:


CONSTITUTIONAL :  Denies fever,  chills, or sweats.  Denies recent illness.


MUSCULOSKELETAL: Left ankle pain


SKIN:   Denies rash or skin lesions.


NEUROLOGICAL:  Denies sensory or motor loss.


ALL OTHER SYSTEMS REVIEWED AND NEGATIVE.





Physical Exam





- Vital signs


Vitals: 


                                        











Temp Pulse Resp BP Pulse Ox


 


 98.4 F   105 H  18   143/82 H  97 


 


 03/26/19 20:17  03/26/19 20:17  03/26/19 20:17  03/26/19 20:17  03/26/19 20:17














- Notes


Notes: 





General Appearance: Well nourished, alert, cooperative, no acute distress, no 

obvious discomfort.


Vitals: reviewed, See vital signs table.


Extremities: strength 5/5 in all extremities, good pulses in all extremities, 

left ankle pain.  Obvious swelling to lateral aspect left ankle causing the foot

the point and words.  Pain to palpation over lateral aspect of the ankle.  No 

pain to the medial aspect of the ankle.  No pain to the distal foot.  Good 

capillary refill and distal sensation in the foot.  No pain to palpation left 

knee or proximal fibula.


Skin: warm, dry, appropriate color, no rash


Neuro: speech clear, oriented x 3, normal affect, responds appropriately to 

questions.





Course





- Re-evaluation


Re-evalutation: 





03/26/19 23:38


Patient's x-rays are negative.  Her foot was turned inward by think is related 

to all the swelling and over the lateral malleolus likely related injury to the 

tendons on that side.  We will give her an Ace wrap and have ordered crutches to

use crutches.  I informed her not to bear weight on the foot if it is painful.  

I informed her that if she still having pain with weightbearing after 1 week 

then she should follow-up with orthopedist for reevaluation.  Patient agrees 

with plan and will be discharged home.





Dictation of this chart was performed using voice recognition software; 

therefore, there may be some unintended grammatical errors.





- Vital Signs


Vital signs: 


                                        











Temp Pulse Resp BP Pulse Ox


 


 98.4 F   105 H  18   143/82 H  97 


 


 03/26/19 20:17  03/26/19 20:17  03/26/19 20:17  03/26/19 20:17  03/26/19 20:17














Discharge





- Discharge


Clinical Impression: 


Ankle sprain


Qualifiers:


 Encounter type: initial encounter Involved ligament of ankle: unspecified 

ligament Laterality: left Qualified Code(s): S93.402A - Sprain of unspecified 

ligament of left ankle, initial encounter





Condition: Good


Disposition: HOME, SELF-CARE


Additional Instructions: 


You x-rays are negative for fracture.  You have an ankle sprain.  These take 

Tylenol 500 mg every 4 hours and/or Motrin 400 mg every 6 hours for pain.  

Please keep your foot elevated when sitting.  When laying in bed please elevate 

your foot on several pillows.  This will help reduce the amount of swelling.  

You can use cold packs 20 minutes at a time.  This will also help reduce 

swelling. Treatment is to stay off your foot for the next several days.  Please 

use crutches for at least 2 days.  After 2 days you can slowly start to try to 

bear weight on your foot.  If you are still having significant pain with 

weightbearing then continued use of crutches for another 24-48 hours.  You can 

stop using the crutches when you are able to bear weight on your foot without 

having significant pain and walk without significant pain.  Please follow up 

with the orthopedist, Dr. Rubin, in 1 week for reevaluation if you are still 

having to  use the crutches because it is too painful to bear weight on your 

ankle.  No sporting activities or running for at least 2 weeks.


Forms:  Special Work Note


Referrals: 


PHILIP RUBIN DO [ACTIVE STAFF] - Follow up in 1 week

## 2019-03-27 VITALS — SYSTOLIC BLOOD PRESSURE: 117 MMHG | DIASTOLIC BLOOD PRESSURE: 69 MMHG
